# Patient Record
Sex: MALE | Race: WHITE | Employment: FULL TIME | URBAN - METROPOLITAN AREA
[De-identification: names, ages, dates, MRNs, and addresses within clinical notes are randomized per-mention and may not be internally consistent; named-entity substitution may affect disease eponyms.]

---

## 2024-01-10 ENCOUNTER — HOSPITAL ENCOUNTER (EMERGENCY)
Age: 34
Discharge: HOME | End: 2024-01-10

## 2024-01-10 ENCOUNTER — APPOINTMENT (OUTPATIENT)
Dept: GENERAL RADIOLOGY | Age: 34
End: 2024-01-10

## 2024-01-10 VITALS
HEART RATE: 78 BPM | RESPIRATION RATE: 22 BRPM | DIASTOLIC BLOOD PRESSURE: 106 MMHG | TEMPERATURE: 97.3 F | SYSTOLIC BLOOD PRESSURE: 172 MMHG | OXYGEN SATURATION: 100 %

## 2024-01-10 DIAGNOSIS — I49.3 PVC (PREMATURE VENTRICULAR CONTRACTION): ICD-10-CM

## 2024-01-10 DIAGNOSIS — R00.2 PALPITATIONS: Primary | ICD-10-CM

## 2024-01-10 LAB
ALBUMIN SERPL-MCNC: 4.6 G/DL (ref 3.5–5.2)
ALP SERPL-CCNC: 58 U/L (ref 40–129)
ALT SERPL-CCNC: 19 U/L (ref 0–40)
ANION GAP SERPL CALCULATED.3IONS-SCNC: 11 MMOL/L (ref 7–16)
AST SERPL-CCNC: 14 U/L (ref 0–39)
BASOPHILS # BLD: 0.05 K/UL (ref 0–0.2)
BASOPHILS NFR BLD: 1 % (ref 0–2)
BILIRUB SERPL-MCNC: 0.4 MG/DL (ref 0–1.2)
BUN SERPL-MCNC: 9 MG/DL (ref 6–20)
CALCIUM SERPL-MCNC: 9.3 MG/DL (ref 8.6–10.2)
CHLORIDE SERPL-SCNC: 102 MMOL/L (ref 98–107)
CO2 SERPL-SCNC: 25 MMOL/L (ref 22–29)
CREAT SERPL-MCNC: 1 MG/DL (ref 0.7–1.2)
EOSINOPHIL # BLD: 0.04 K/UL (ref 0.05–0.5)
EOSINOPHILS RELATIVE PERCENT: 1 % (ref 0–6)
ERYTHROCYTE [DISTWIDTH] IN BLOOD BY AUTOMATED COUNT: 13.3 % (ref 11.5–15)
GFR SERPL CREATININE-BSD FRML MDRD: >60 ML/MIN/1.73M2
GLUCOSE SERPL-MCNC: 102 MG/DL (ref 74–99)
HCT VFR BLD AUTO: 50.5 % (ref 37–54)
HGB BLD-MCNC: 17.2 G/DL (ref 12.5–16.5)
IMM GRANULOCYTES # BLD AUTO: 0.03 K/UL (ref 0–0.58)
IMM GRANULOCYTES NFR BLD: 0 % (ref 0–5)
LYMPHOCYTES NFR BLD: 2.07 K/UL (ref 1.5–4)
LYMPHOCYTES RELATIVE PERCENT: 24 % (ref 20–42)
MAGNESIUM SERPL-MCNC: 2.4 MG/DL (ref 1.6–2.6)
MCH RBC QN AUTO: 30.2 PG (ref 26–35)
MCHC RBC AUTO-ENTMCNC: 34.1 G/DL (ref 32–34.5)
MCV RBC AUTO: 88.6 FL (ref 80–99.9)
MONOCYTES NFR BLD: 0.64 K/UL (ref 0.1–0.95)
MONOCYTES NFR BLD: 7 % (ref 2–12)
NEUTROPHILS NFR BLD: 67 % (ref 43–80)
NEUTS SEG NFR BLD: 5.83 K/UL (ref 1.8–7.3)
PLATELET # BLD AUTO: 298 K/UL (ref 130–450)
PMV BLD AUTO: 9.8 FL (ref 7–12)
POTASSIUM SERPL-SCNC: 4.2 MMOL/L (ref 3.5–5)
PROT SERPL-MCNC: 7.6 G/DL (ref 6.4–8.3)
RBC # BLD AUTO: 5.7 M/UL (ref 3.8–5.8)
SODIUM SERPL-SCNC: 138 MMOL/L (ref 132–146)
T4 FREE SERPL-MCNC: 1.2 NG/DL (ref 0.9–1.7)
TROPONIN I SERPL HS-MCNC: 22 NG/L (ref 0–11)
TROPONIN I SERPL HS-MCNC: 23 NG/L (ref 0–11)
TSH SERPL DL<=0.05 MIU/L-ACNC: 0.8 UIU/ML (ref 0.27–4.2)
WBC OTHER # BLD: 8.7 K/UL (ref 4.5–11.5)

## 2024-01-10 PROCEDURE — 99285 EMERGENCY DEPT VISIT HI MDM: CPT

## 2024-01-10 PROCEDURE — 93005 ELECTROCARDIOGRAM TRACING: CPT | Performed by: PHYSICIAN ASSISTANT

## 2024-01-10 PROCEDURE — 84484 ASSAY OF TROPONIN QUANT: CPT

## 2024-01-10 PROCEDURE — 71046 X-RAY EXAM CHEST 2 VIEWS: CPT

## 2024-01-10 PROCEDURE — 80053 COMPREHEN METABOLIC PANEL: CPT

## 2024-01-10 PROCEDURE — 85025 COMPLETE CBC W/AUTO DIFF WBC: CPT

## 2024-01-10 PROCEDURE — 84439 ASSAY OF FREE THYROXINE: CPT

## 2024-01-10 PROCEDURE — 84443 ASSAY THYROID STIM HORMONE: CPT

## 2024-01-10 PROCEDURE — 83735 ASSAY OF MAGNESIUM: CPT

## 2024-01-10 NOTE — ED PROVIDER NOTES
Normal    The patient’s available past medical records and past encounters were reviewed.        ------------------------------ ED COURSE/MEDICAL DECISION MAKING----------------------  Medications - No data to display        CAD Risk Factors:      Smoker:    No.       FHx:    No.       High Lipids:    No.       HTN:    No.       Diabetes:    No.       Cocaine Use:    No.       Lupus:    No.         The PERLA Risk Score for Unstable Angina/ Non-ST elevation MI:  Age ? 65 years?  no (0)   ? 3 Risk Factors for CAD?  no (0)   Known CAD (stenosis ? 50%)?  no (0)   ASA Use in Past 7d?  no (0)   Severe angina (? 2 episodes w/in 24 hrs)?   no (0)   ST changes ? 0.5mm?  no (0)   Positive Cardiac Marker?   no (0)   Total: 0       ED Course as of 01/10/24 1920   Wed Sergio 10, 2024   1613 EKG:  This EKG is signed and interpreted by me.    Rate: 61  Rhythm: Sinus  Interpretation: non-specific EKG  Comparison: no PVC's as compared to EKG done earlier today.    [MS]      ED Course User Index  [MS] Dana Ceballos PA         Medical Decision Making:    Patient is a 33-year-old male presenting to the emergency department palpitations throughout the day today.  No evidence of leukocytosis or anemia on CBC.  Electrolytes within normal limits.  Renal function within normal limits.  No elevation LFTs.  Thyroid studies within normal limits.  Magnesium 2.4.  Initial EKG did show sinus rhythm with some PVCs however on repeat EKG there were no PVCs noted.  Initial troponin at 23 with a repeat at 22.  Chest x-ray does not reveal any evidence of pneumonia or pneumothorax.  Patient is nontoxic-appearing, afebrile, no acute distress.  Patient continues to deny any chest pain or shortness of breath.  At this time we will plan on outpatient symptom management with very close follow-up with PCP and cardiology for recheck.  Return to the ER with any new or worsening symptoms.  Return precautions were discussed.  Patient voiced understanding and is

## 2024-01-10 NOTE — ED NOTES
Department of Emergency Medicine  FIRST PROVIDER TRIAGE NOTE             Independent MLP           1/10/24  11:42 AM EST    Date of Encounter: 1/10/24   MRN: [id number]      HPI: Dipak Myers is a 33 y.o. male who presents to the ED for Palpitations (Per patient gets palpitations and has to \"bear down\" to get palpitations to stop)     Pt presenting with irregular heart beat     ROS: Negative for cp or sob.    PE: Gen Appearance/Constitutional: alert  HEENT: NC/NT. PERRLA,  Airway patent.     Initial Plan of Care: All treatment areas with department are currently occupied. Plan to order/Initiate the following while awaiting opening in ED: EKG.  Initiate Treatment-Testing, Proceed toTreatment Area When Bed Available for ED Attending/MLP to Continue Care    Electronically signed by Swathi Whitmore PA-C   DD: 1/10/24      Swathi Whitmore PA-C  01/10/24 1144

## 2024-01-11 LAB
EKG ATRIAL RATE: 61 BPM
EKG ATRIAL RATE: 81 BPM
EKG P AXIS: 17 DEGREES
EKG P AXIS: 29 DEGREES
EKG P-R INTERVAL: 122 MS
EKG P-R INTERVAL: 152 MS
EKG Q-T INTERVAL: 386 MS
EKG Q-T INTERVAL: 428 MS
EKG QRS DURATION: 102 MS
EKG QRS DURATION: 108 MS
EKG QTC CALCULATION (BAZETT): 430 MS
EKG QTC CALCULATION (BAZETT): 448 MS
EKG R AXIS: -12 DEGREES
EKG R AXIS: -13 DEGREES
EKG T AXIS: 25 DEGREES
EKG T AXIS: 42 DEGREES
EKG VENTRICULAR RATE: 61 BPM
EKG VENTRICULAR RATE: 81 BPM

## 2024-01-11 PROCEDURE — 93010 ELECTROCARDIOGRAM REPORT: CPT | Performed by: INTERNAL MEDICINE
